# Patient Record
Sex: FEMALE | Race: WHITE | Employment: UNEMPLOYED | URBAN - METROPOLITAN AREA
[De-identification: names, ages, dates, MRNs, and addresses within clinical notes are randomized per-mention and may not be internally consistent; named-entity substitution may affect disease eponyms.]

---

## 2025-01-22 ENCOUNTER — PROCEDURE VISIT (OUTPATIENT)
Dept: NEUROLOGY | Age: 35
End: 2025-01-22
Payer: MEDICAID

## 2025-01-22 VITALS — BODY MASS INDEX: 29.49 KG/M2 | HEIGHT: 65 IN | WEIGHT: 177 LBS

## 2025-01-22 DIAGNOSIS — M54.2 NECK PAIN: ICD-10-CM

## 2025-01-22 DIAGNOSIS — R20.2 NUMBNESS AND TINGLING IN BOTH HANDS: Primary | ICD-10-CM

## 2025-01-22 DIAGNOSIS — R20.0 NUMBNESS AND TINGLING IN BOTH HANDS: Primary | ICD-10-CM

## 2025-01-22 DIAGNOSIS — G56.03 CARPAL TUNNEL SYNDROME, BILATERAL: ICD-10-CM

## 2025-01-22 PROCEDURE — 95913 NRV CNDJ TEST 13/> STUDIES: CPT | Performed by: PSYCHIATRY & NEUROLOGY

## 2025-01-22 PROCEDURE — 95886 MUSC TEST DONE W/N TEST COMP: CPT | Performed by: PSYCHIATRY & NEUROLOGY

## 2025-01-22 NOTE — PROGRESS NOTES
EMG/Nerve Conduction Study Procedure Note  [address]    [address]  [place]  [address]   [phone]      Hx:    Exam:     34 y.o.   female     EMG::    BUE    CTS... She notes a moderate degree of neck pain.  No atrophy.  Tinel equivocal positive.  Flick/Phalen positive.  No Cade.  No Mary.  No Spurling or Lhermitte sign.  Referring:    Hand [facility]tr          Dr CHRISTA Brown  Technologist ::   Racquel Chaparro  Hgt:     5'5\"           Summary    needle EMG selected upper extremity sampled muscles with CV studies.                  Controlled environmental factors / EMG lab.  Temperature.   NCV : sensory segments:     Abnormal = slowed bilateral median distal SCV.  Normal bilateral ulnar bilateral radial SCV.       NCV transcarpal sensory segments:      Abnormal = bilateral median transcarpal slowing SCV with peak difference of latency abnormal increased = right peak is 1.04 ms (UL = 0.20 ms); left peak 0.60 ms  NCV Motor MCV segments:       Abnormal right median TL 4.71 ms (UL = 4.15 ms).  Normal left median and bilateral ulnar MCV.      F-wave studies:      Normal bilateral median bilateral ulnar F waves.      NEEDLE EMG:   Tested muscles::    Tested bilateral upper extremity FDI APB ADM EDC triceps deltoid biceps muscles = all of these are within normal limits without any denervation.                INTERPRETATION:    THESE FINDINGS ARE ELECTROPHYSIOLOGIC EVIDENCE OF ENTRAPPED MEDIAN NERVE BILATERALLY WORSE ON THE RIGHT THE RIGHT BEING MODERATELY SEVERE THE LEFT MILD.  NO EVIDENCE FOR PLEXOPATHY OR CERVICAL RADICULOPATHY.  NO AXONAL DENERVATION.             CONCLUSION: Compatible with moderate right and mild left carpal tunnel syndromes.                  Procedure Details:    Other clinical correlation is warranted.  Moderate right carpal tunnel syndrome and mild left carpal tunnel syndrome identified, without cervical radiculopathy or brachial plexopathy.    Patient made aware.                 Please